# Patient Record
Sex: MALE | Race: WHITE | URBAN - METROPOLITAN AREA
[De-identification: names, ages, dates, MRNs, and addresses within clinical notes are randomized per-mention and may not be internally consistent; named-entity substitution may affect disease eponyms.]

---

## 2020-07-02 ENCOUNTER — PREPPED CHART (OUTPATIENT)
Dept: URBAN - METROPOLITAN AREA CLINIC 19 | Facility: CLINIC | Age: 60
End: 2020-07-02

## 2021-01-13 NOTE — PATIENT DISCUSSION
"""Patient given final contact lens prescription. 1/13/2021"" ""Reviewed proper use and contact lens care. Discussed possible risks associated with contact lens wear.  Educated patient no sleeping

## 2022-01-19 NOTE — PATIENT DISCUSSION
"""""""Contact lens replacement schedule reviewed with patient. Solution sample given to patient today.  ""
"""Disp Trial #1.  Good cl fit
Detail Level: Detailed
Duration Of Freeze Thaw-Cycle (Seconds): 1
Post-Care Instructions: I reviewed with the patient in detail post-care instructions. Patient is to wear sunprotection, and avoid picking at any of the treated lesions. Pt may apply Vaseline to crusted or scabbing areas.
Number Of Freeze-Thaw Cycles: 2 freeze-thaw cycles
Show Applicator Variable?: Yes
Render Note In Bullet Format When Appropriate: No
Consent: The patient's consent was obtained including but not limited to risks of crusting, scabbing, blistering, scarring, darker or lighter pigmentary change, recurrence, incomplete removal and infection.

## 2022-04-18 ASSESSMENT — VISUAL ACUITY
OS_CC: 20/20
OD_CC: 20/20

## 2022-04-18 ASSESSMENT — TONOMETRY
OD_IOP_MMHG: 8
OS_IOP_MMHG: 8

## 2022-04-20 ENCOUNTER — ESTABLISHED PATIENT (OUTPATIENT)
Dept: URBAN - METROPOLITAN AREA CLINIC 19 | Facility: CLINIC | Age: 62
End: 2022-04-20

## 2022-04-20 DIAGNOSIS — H52.4: ICD-10-CM

## 2022-04-20 DIAGNOSIS — H11.042: ICD-10-CM

## 2022-04-20 PROCEDURE — 92014 COMPRE OPH EXAM EST PT 1/>: CPT

## 2022-04-20 ASSESSMENT — VISUAL ACUITY
OD_CC: 20/30-1
OS_CC: 20/30-1
OU_CC: 20/20-1

## 2022-04-20 ASSESSMENT — KERATOMETRY
OS_K1POWER_DIOPTERS: 45.50
OS_AXISANGLE_DEGREES: 70
OD_AXISANGLE_DEGREES: 112
OS_AXISANGLE2_DEGREES: 160
OD_AXISANGLE2_DEGREES: 22
OD_K1POWER_DIOPTERS: 45.50
OD_K2POWER_DIOPTERS: 47.00
OS_K2POWER_DIOPTERS: 47.25

## 2022-04-20 ASSESSMENT — TONOMETRY
OD_IOP_MMHG: 9
OS_IOP_MMHG: 9

## 2022-11-30 NOTE — PATIENT DISCUSSION
Discussed removal of lenses and prompt return to clinic with any pain, redness, or excessive discomfort. Advised against sleeping in lenses or using beyond time frame of approval and risk of infection or even possible loss of vision with doing so. Discussed proper contact lens hygiene such as cases tossed after 3 months of use, no topping off of solution, and no tap water or use of contact lenses with hot tubs. Walk in

## 2022-11-30 NOTE — PATIENT DISCUSSION
Retina attached 360. Retinal tear and detachment warning symptoms reviewed and patient instructed to call immediately if increasing floaters, flashes, or decreasing peripheral vision.

## 2022-11-30 NOTE — PATIENT DISCUSSION
Per moderate c/d ratio, OS>OD. IOP 20/17. (+)Family hx of Glaucoma, mother. Schedule HVF/OCT(RNFL) next available.

## 2023-04-26 ENCOUNTER — ESTABLISHED PATIENT (OUTPATIENT)
Dept: URBAN - METROPOLITAN AREA CLINIC 19 | Facility: CLINIC | Age: 63
End: 2023-04-26

## 2023-04-26 DIAGNOSIS — H52.4: ICD-10-CM

## 2023-04-26 DIAGNOSIS — H11.042: ICD-10-CM

## 2023-04-26 PROCEDURE — 92015 DETERMINE REFRACTIVE STATE: CPT

## 2023-04-26 PROCEDURE — 92012 INTRM OPH EXAM EST PATIENT: CPT

## 2023-04-26 RX ORDER — BRIMONIDINE TARTRATE 0.25 MG/ML: 1 SOLUTION/ DROPS OPHTHALMIC AS NEEDED

## 2023-04-26 ASSESSMENT — VISUAL ACUITY
OS_CC: 20/25-2
OD_CC: 20/30+2
OU_CC: 20/20

## 2023-04-26 ASSESSMENT — KERATOMETRY
OD_AXISANGLE_DEGREES: 7
OS_AXISANGLE2_DEGREES: 7
OD_AXISANGLE2_DEGREES: 97
OS_AXISANGLE_DEGREES: 97
OS_K2POWER_DIOPTERS: 46.25
OD_K2POWER_DIOPTERS: 46.00
OS_K1POWER_DIOPTERS: 47.00
OD_K1POWER_DIOPTERS: 45.75

## 2023-04-26 ASSESSMENT — TONOMETRY
OD_IOP_MMHG: 8
OS_IOP_MMHG: 8

## 2024-04-30 ENCOUNTER — ESTABLISHED PATIENT (OUTPATIENT)
Dept: URBAN - METROPOLITAN AREA CLINIC 19 | Facility: CLINIC | Age: 64
End: 2024-04-30

## 2024-04-30 DIAGNOSIS — H25.813: ICD-10-CM

## 2024-04-30 DIAGNOSIS — H52.4: ICD-10-CM

## 2024-04-30 DIAGNOSIS — H02.886: ICD-10-CM

## 2024-04-30 DIAGNOSIS — H11.042: ICD-10-CM

## 2024-04-30 DIAGNOSIS — H02.883: ICD-10-CM

## 2024-04-30 DIAGNOSIS — H18.593: ICD-10-CM

## 2024-04-30 PROCEDURE — 92015 DETERMINE REFRACTIVE STATE: CPT | Mod: NC

## 2024-04-30 PROCEDURE — 99213 OFFICE O/P EST LOW 20 MIN: CPT

## 2024-04-30 ASSESSMENT — KERATOMETRY
OS_AXISANGLE2_DEGREES: 62
OS_AXISANGLE_DEGREES: 152
OD_AXISANGLE_DEGREES: 153
OS_K1POWER_DIOPTERS: 48.25
OD_K1POWER_DIOPTERS: 46.25
OD_K2POWER_DIOPTERS: 46.75
OS_K2POWER_DIOPTERS: 46
OD_AXISANGLE2_DEGREES: 63

## 2024-04-30 ASSESSMENT — TONOMETRY
OD_IOP_MMHG: 10
OS_IOP_MMHG: 9

## 2024-04-30 ASSESSMENT — VISUAL ACUITY
OS_CC: 20/25-3
OU_SC: 20/30
OD_CC: 20/30-1

## 2025-05-01 ENCOUNTER — COMPREHENSIVE EXAM (OUTPATIENT)
Age: 65
End: 2025-05-01

## 2025-05-01 DIAGNOSIS — H11.042: ICD-10-CM

## 2025-05-01 DIAGNOSIS — H18.593: ICD-10-CM

## 2025-05-01 DIAGNOSIS — H52.4: ICD-10-CM

## 2025-05-01 DIAGNOSIS — H02.883: ICD-10-CM

## 2025-05-01 DIAGNOSIS — H25.813: ICD-10-CM

## 2025-05-01 DIAGNOSIS — H02.886: ICD-10-CM

## 2025-05-01 PROCEDURE — 92014 COMPRE OPH EXAM EST PT 1/>: CPT

## 2025-05-01 PROCEDURE — 92015 DETERMINE REFRACTIVE STATE: CPT | Mod: NC
